# Patient Record
Sex: FEMALE | URBAN - NONMETROPOLITAN AREA
[De-identification: names, ages, dates, MRNs, and addresses within clinical notes are randomized per-mention and may not be internally consistent; named-entity substitution may affect disease eponyms.]

---

## 2020-03-31 ENCOUNTER — APPOINTMENT (OUTPATIENT)
Age: 20
Setting detail: DERMATOLOGY
End: 2020-03-31

## 2020-03-31 DIAGNOSIS — L30.9 DERMATITIS, UNSPECIFIED: ICD-10-CM

## 2020-03-31 DIAGNOSIS — L29.8 OTHER PRURITUS: ICD-10-CM

## 2020-03-31 DIAGNOSIS — L85.3 XEROSIS CUTIS: ICD-10-CM

## 2020-03-31 PROCEDURE — OTHER COUNSELING: OTHER

## 2020-03-31 PROCEDURE — OTHER ADDITIONAL NOTES: OTHER

## 2020-03-31 PROCEDURE — OTHER MIPS QUALITY: OTHER

## 2020-03-31 PROCEDURE — 99203 OFFICE O/P NEW LOW 30 MIN: CPT | Mod: CR

## 2020-03-31 PROCEDURE — OTHER REASSURANCE: OTHER

## 2020-03-31 PROCEDURE — OTHER PRESCRIPTION: OTHER

## 2020-03-31 RX ORDER — NYSTATIN AND TRIAMCINOLONE ACETONIDE 100000; 1 [USP'U]/G; MG/G
AS DIRECTED CREAM TOPICAL BID
Qty: 1 | Refills: 3 | Status: ERX | COMMUNITY
Start: 2020-03-31

## 2020-03-31 ASSESSMENT — LOCATION ZONE DERM: LOCATION ZONE: ARM

## 2020-03-31 ASSESSMENT — LOCATION DETAILED DESCRIPTION DERM
LOCATION DETAILED: RIGHT ANTERIOR DISTAL UPPER ARM
LOCATION DETAILED: LEFT ANTERIOR PROXIMAL UPPER ARM
LOCATION DETAILED: LEFT ANTERIOR DISTAL UPPER ARM

## 2020-03-31 ASSESSMENT — LOCATION SIMPLE DESCRIPTION DERM
LOCATION SIMPLE: RIGHT UPPER ARM
LOCATION SIMPLE: LEFT UPPER ARM

## 2020-03-31 NOTE — PROCEDURE: ADDITIONAL NOTES
Detail Level: Simple
Additional Notes: Patient was seen via telemedicine due to coved-19 pandemic and understands that this visit will be billed to her insurance.

## 2020-04-07 ENCOUNTER — APPOINTMENT (OUTPATIENT)
Age: 20
Setting detail: DERMATOLOGY
End: 2020-04-07